# Patient Record
Sex: MALE | Race: WHITE | NOT HISPANIC OR LATINO | Employment: FULL TIME | URBAN - METROPOLITAN AREA
[De-identification: names, ages, dates, MRNs, and addresses within clinical notes are randomized per-mention and may not be internally consistent; named-entity substitution may affect disease eponyms.]

---

## 2021-11-22 ENCOUNTER — OFFICE VISIT (OUTPATIENT)
Dept: ENDOCRINOLOGY | Facility: CLINIC | Age: 51
End: 2021-11-22

## 2021-11-22 ENCOUNTER — OFFICE VISIT (OUTPATIENT)
Dept: DIABETES SERVICES | Facility: HOSPITAL | Age: 51
End: 2021-11-22

## 2021-11-22 VITALS
BODY MASS INDEX: 29.65 KG/M2 | HEIGHT: 74 IN | WEIGHT: 231 LBS | SYSTOLIC BLOOD PRESSURE: 134 MMHG | HEART RATE: 52 BPM | OXYGEN SATURATION: 96 % | DIASTOLIC BLOOD PRESSURE: 91 MMHG

## 2021-11-22 DIAGNOSIS — E10.649 TYPE 1 DIABETES MELLITUS WITH HYPOGLYCEMIA AND WITHOUT COMA (HCC): ICD-10-CM

## 2021-11-22 DIAGNOSIS — E10.65 UNCONTROLLED TYPE 1 DIABETES MELLITUS WITH HYPERGLYCEMIA (HCC): Primary | ICD-10-CM

## 2021-11-22 LAB
EXPIRATION DATE: NORMAL
GLUCOSE BLDC GLUCOMTR-MCNC: 79 MG/DL (ref 70–130)
Lab: NORMAL

## 2021-11-22 PROCEDURE — 82947 ASSAY GLUCOSE BLOOD QUANT: CPT | Performed by: INTERNAL MEDICINE

## 2021-11-22 PROCEDURE — 99204 OFFICE O/P NEW MOD 45 MIN: CPT | Performed by: INTERNAL MEDICINE

## 2021-11-22 RX ORDER — LISINOPRIL 20 MG/1
TABLET ORAL
COMMUNITY
Start: 2021-08-31

## 2021-11-22 RX ORDER — ATORVASTATIN CALCIUM 20 MG/1
TABLET, FILM COATED ORAL
COMMUNITY
Start: 2021-08-31

## 2021-11-22 RX ORDER — ASPIRIN 81 MG/1
162 TABLET, CHEWABLE ORAL DAILY
COMMUNITY

## 2021-11-22 RX ORDER — INSULIN GLARGINE 100 [IU]/ML
INJECTION, SOLUTION SUBCUTANEOUS
COMMUNITY
Start: 2021-08-24

## 2021-11-22 NOTE — PROGRESS NOTES
"     Office Note      Date: 2021  Patient Name: Mignon Proctor  MRN: [id number]  : 1970    Chief Complaint   Patient presents with   • Diabetes       History of Present Illness:   Mignon Proctor is a 51 y.o. male who presents for Diabetes type 1. Diagnosed in: . Treated in past with insulin. Current treatments: basal bolus insulin. Number of insulin shots per day: 4. Checks blood sugar >4 times a day. Has low blood sugar: occasional. Aspirin use: Yes. Statin use: Yes. ACE-I/ARB use: Yes. Last eye exam: .    He has had DM education in the past.  He is interested in insulin pump therapy.    Subjective      Diabetic Complications:  Eyes: No  Kidneys: No  Feet: No  Heart: No    Diet and Exercise:  Meals per day: 3  Minutes of exercise per week: 180 mins.    Review of Systems:   Review of Systems   Constitutional: Negative.    HENT: Negative.    Eyes: Negative.    Respiratory: Negative.    Cardiovascular: Negative.    Gastrointestinal: Negative.    Endocrine: Negative.    Genitourinary: Negative.    Musculoskeletal: Positive for arthralgias and back pain.   Skin: Positive for rash.   Allergic/Immunologic: Negative.    Neurological: Negative.    Hematological: Negative.    Psychiatric/Behavioral: Negative.        The following portions of the patient's history were reviewed and updated as appropriate: allergies, current medications, past family history, past medical history, past social history, past surgical history and problem list.    Objective     Visit Vitals  /91 (BP Location: Left arm, Patient Position: Sitting, Cuff Size: Adult)   Pulse 52   Ht 188 cm (74\")   Wt 105 kg (231 lb)   SpO2 96%   BMI 29.66 kg/m²       Physical Exam:  Physical Exam  Constitutional:       Appearance: Normal appearance.   HENT:      Head: Normocephalic and atraumatic.   Eyes:      Extraocular Movements: Extraocular movements intact.      Conjunctiva/sclera: Conjunctivae normal.      Pupils: Pupils are equal, " round, and reactive to light.   Neck:      Thyroid: No thyroid mass, thyromegaly or thyroid tenderness.   Cardiovascular:      Rate and Rhythm: Normal rate and regular rhythm.      Pulses: Normal pulses.           Dorsalis pedis pulses are 2+ on the right side and 2+ on the left side.        Posterior tibial pulses are 2+ on the right side and 2+ on the left side.      Heart sounds: Normal heart sounds.   Pulmonary:      Effort: Pulmonary effort is normal.      Breath sounds: Normal breath sounds.   Abdominal:      General: Bowel sounds are normal.      Palpations: Abdomen is soft.   Musculoskeletal:         General: Normal range of motion.      Cervical back: Normal range of motion and neck supple.   Feet:      Right foot:      Protective Sensation: 5 sites tested. 5 sites sensed.      Toenail Condition: Right toenails are abnormally thick.      Left foot:      Protective Sensation: 5 sites tested. 5 sites sensed.      Toenail Condition: Left toenails are abnormally thick.      Comments: Psoriasis on heels  Lymphadenopathy:      Cervical: No cervical adenopathy.   Skin:     General: Skin is warm and dry.   Neurological:      General: No focal deficit present.      Mental Status: He is alert.   Psychiatric:         Mood and Affect: Mood normal.         Behavior: Behavior normal.         Thought Content: Thought content normal.         Judgment: Judgment normal.         Labs:    HbA1c  No results found for: HGBA1C.    CMP  No results found for: GLUCOSE, BUN, CREATININE, EGFRIFNONA, EGFRIFAFRI, BCR, K, CO2, CALCIUM, PROTENTOTREF, LABIL2, BILIRUBIN, AST, ALT     Lipid Panel        TSH  No results found for: TSH, FREET4     Hemoglobin A1C        Microalbumin/Creatinine  No results found for: MALBCRERATI        Assessment / Plan      Assessment & Plan:  Diagnoses and all orders for this visit:    1. Uncontrolled type 1 diabetes mellitus with hyperglycemia (HCC) (Primary)  Assessment & Plan:  Trying to track down recent  labs.  His control has been okay in the past.  He is interested in insulin pump and sensor combination.  He would be a good candidate for this.    Orders:  -     POC Glucose, Blood  -     Ambulatory Referral to Diabetic Education  -     Comprehensive Metabolic Panel; Future  -     Lipid Panel; Future  -     Microalbumin / Creatinine Urine Ratio - Urine, Clean Catch; Future  -     TSH; Future  -     Hemoglobin A1c; Future    2. Type 1 diabetes mellitus with hypoglycemia and without coma (HCC)  Assessment & Plan:  We discussed CGM today.         Return in about 3 months (around 2/22/2022) for Recheck with A1c, CMP, lipids, TSH, microalbumin.    John Mohan MD   11/22/2021

## 2021-11-22 NOTE — CONSULTS
Patient was seen today as a courtesy visit for insulin pump information. Patient interested in pump/CGMS combination. He was provided information on Tandem T-Slim, Dexcom and Medtronic. Demo pumps were used so patient could play with each pump. We discussed how each worked and pros and cons. Patient advised to contact insurance to discuss coverage and cost. Also encouraged him to look at each company's website. Patient encouraged to call with any questions or concerns.

## 2021-11-22 NOTE — ASSESSMENT & PLAN NOTE
Trying to track down recent labs.  His control has been okay in the past.  He is interested in insulin pump and sensor combination.  He would be a good candidate for this.

## 2021-11-29 LAB — HBA1C MFR BLD: 6.5 %
